# Patient Record
(demographics unavailable — no encounter records)

---

## 2025-01-03 NOTE — HISTORY OF PRESENT ILLNESS
[FreeTextEntry1] : Since last visit, she started IVIg (Gammagard) treatment: 30gm IVPB, daily for 5 days, every 4 weeks for 3 months. She experiences improvement in the feet with less numbness. Feet are still tingle. Balance is still poor. Still has some leg cramps. No fall. No noticeable improvement in symptoms of hands. No side effects.  Previous visits:  She presented initially for evaluation of symptoms of upper and lower extremities as well as cognitive complaints. At this point, no longer has cognitive complaint.  She developed insidious onset of left hand tingling and numbness affecting predominantly the fingers for more than 1 year(7/2023). Of note, she is right handed. Over time, the symptoms got worse. At this point, she has tingling and numbness all the time. Sometimes, the abnormal sensations spread upward to distal forearm. She at times woke up in the middle of night by the tingling. However, she denied associated pain at wrist, elbow or neck. During the day, she had no limitation of using the hand. The tingling and numbness did not change regardless what she did. The most numb finger is the 5th digit. She had a chronic head tilt that she is aware of. However, she denied pain in the neck. She did have chronic left shoulder problem and had left shoulder replacement.  She also had chronic numbness of toes in right foot shortly after right knee surgery in 2011. Over the last few months, the toes in left foot also got numb, associated with tingling. She denied weakness of arms and legs. She had low level of chronic low back pain without radiation. She stated that she lost 2 inches in height due to arthritis in spine. However, she had no established diagnosis by rheumatologist. She used meloxicam excessively, and stopped since recent lab showed impaired renal function. The repeat lab showed persistence of mild impaired renal function. Did not have more follow up blood work since last visit.  She endorsed chronic bladder dysfunction for 5 years without known diagnosis by . She has less sense of urinary urgency since last visit. There is no issue in bowel movement.  Over the years, she felt more clumsy in balance control. She had some falls lead to bruises. Balance control has gotten worse prior to initiation of IVIg. She also experiences intermittent leg cramps at night.

## 2025-01-03 NOTE — PHYSICAL EXAM
[FreeTextEntry1] : Constitutional: alert and in no acute distress. Psychiatric: oriented to person, place, and time, insight and judgment were intact and the affect was normal. Neurologic: Orientation: oriented to person, oriented to place and oriented to time. Memory: recent registration memory intact. Attention: normal concentrating ability and visual attention was not decreased. Language: no difficulty naming common objects, no difficulty repeating a phrase, no difficulty writing a sentence, fluency intact, comprehension intact and reading intact. Fund of knowledge: displays adequate knowledge of personal past history and adequate range of vocabulary. Cranial Nerves: visual acuity intact bilaterally, visual fields full to confrontation, pupils equal round and reactive to light, extraocular motion intact, facial sensation intact symmetrically, face symmetrical, tongue and palate midline, head turning and shoulder shrug symmetric and there was no tongue deviation with protrusion. Cranial nerve VIII: (impaired bilaterally). Motor: muscle tone was normal in all four extremities, muscle strength was normal in all four extremities and normal bulk in all four extremities. Motor Strength:. the patient is right hand dominant. Sensory exam: proprioception was intact. a positive Romberg's sign was present. Light Touch: (mild decrease in left 2-4 digits finger tips, dorsum and palm, distal lower extremities up to ankles). Pain/Temperature: (distal lower extremities up to distal calf in left side, upper calf region in right side). Vibration: Vibratory sensation was decreased at the toes of both feet. Coordination:. balance was intact. there was no past-pointing. no tremor present. Dysdiadochokinesia was not present. Finger to nose dysmetria was not present. Heel-shin dysmetria was not present. Deep tendon reflexes: Biceps right 1+. Biceps left 1+. Triceps right 1+. Triceps left 1+. Brachioradialis right 1+. Brachioradialis left 1+. Patella right 2+. Patella left 2+. Ankle jerk right 1+. Ankle jerk left 0. Plantar responses normal on the right, normal on the left. Ankle Clonus absent on the right, absent on the left. Superficial/Primitive Reflexes: the glabellar reflex was present. Positive Jaw jerk. Neck:. rigid, nontendered bilateral posterior and lateral neck muscles, limited lateral movement and less limited in extension, right laterocollis. Vascular:. there was no peripheral edema. Back: no spinal tenderness. SLR negative. Musculoskeletal:. Tinel of cubital tunnel positive bilaterally, hammer toes and high arch bilaterally, gait narrowed based, good size steps, 2-3 steps to turn. No retropulsion.

## 2025-01-03 NOTE — PROCEDURE
[FreeTextEntry1] : 7/17/24 brain MRI: reported mild small vessel disease. images reviewed with patient. 7/19/24 REEG: reported normal. 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 9/4/24 GM1 IgG mildly positive. Other labs for CIDP work up negative. 9/7/24 lumbar spine with and without contrast: reported mild disc bulging/ridging at the L1-2 through L4-5 levels. No severe stenosis in spinal canal or enhancement; Mild central spondylotic ridging at L5-S1. Images reviewed. 9/7/24 cervical spine with and without contrast: reported DJDs, most prominent at C6-7, right more than left, no obvious enhancement. Images reviewed.

## 2025-01-03 NOTE — DISCUSSION/SUMMARY
[FreeTextEntry1] : Will continue same dosage and schedule of IVIg treatment for next 6 months. Continue daily low dosage of aspirin for stroke prophylaxis.

## 2025-02-24 NOTE — PHYSICAL EXAM
[General Appearance - Alert] : alert [General Appearance - In No Acute Distress] : in no acute distress [Delayed in the Right Toes] : capillary refills normal in right toes [Delayed in the Left Toes] : capillary refills normal in the left toes [Skin Turgor] : normal skin turgor [Foot Ulcer] : no foot ulcer [Position Sense Dec.] : diminished position sense at the level of the toes [Oriented To Time, Place, And Person] : oriented to person, place, and time [Impaired Insight] : insight and judgment were intact [Affect] : the affect was normal

## 2025-02-24 NOTE — ASSESSMENT
[FreeTextEntry1] : 1) Onychomycosis with elongated toenails with pain: aseptic debridement of elongated mycotic toenails x 6, continue topical antifungals 2) Tinea pedis: continue topical antifungal BID  Patient tolerated all treatments well and without any complications Follow up in 2 months [Verbal] : verbal [Patient] : patient [Good - alert, interested, motivated] : Good - alert, interested, motivated [Verbalizes knowledge/Understanding] : Verbalizes knowledge/understanding [Skin Care] : skin care [Pt responsibility to plan of care] : patient responsibility to plan of care

## 2025-02-24 NOTE — PROCEDURE
[FreeTextEntry1] : 1) Onychomycosis with elongated toenails with pain: aseptic debridement of elongated mycotic toenails x 6  Patient tolerated all treatments well and without any complications

## 2025-02-24 NOTE — HISTORY OF PRESENT ILLNESS
[FreeTextEntry1] : Ms. ALTON GRIFFITH is a 71 year F who is seen in the office for onychomycosis, tinea pedis. Patient denies any current or recent f/c/n/v/sob/chest pain. AAOx3 and in NAD. Patient has hx of CIDP and lumbosacral radiculitis.

## 2025-04-28 NOTE — ASSESSMENT
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

## 2025-04-28 NOTE — HISTORY OF PRESENT ILLNESS
[FreeTextEntry1] : Ms. ALTON GRIFFITH is a 71 year F who is seen in the office for peripheral neuropathy to feet, onychomycosis, tinea pedis, callus. Patient denies any current or recent f/c/n/v/sob/chest pain. AAOx3 and in NAD. Patient has hx of CIDP and lumbosacral radiculitis.

## 2025-04-28 NOTE — REASON FOR VISIT
[Follow-Up Visit] : a follow-up visit for [FreeTextEntry2] : peripheral neuropathy to feet, onychomycosis, tinea pedis, callus

## 2025-04-28 NOTE — PROCEDURE
[FreeTextEntry1] : 1) Onychomycosis with elongated toenails with pain: aseptic debridement of elongated mycotic toenails x 6 2) Callus: aseptic debridement and paring of painful Right foot submet 5th callus x 1  Patient tolerated all treatments well and without any complications

## 2025-04-28 NOTE — PHYSICAL EXAM
[General Appearance - Alert] : alert [General Appearance - In No Acute Distress] : in no acute distress [] : normal voice and communication [Delayed in the Right Toes] : capillary refills normal in right toes [Delayed in the Left Toes] : capillary refills normal in the left toes [FreeTextEntry3] : skin temperature gradient is WNL to bilateral lower extremities [No Joint Swelling] : no joint swelling [Skin Turgor] : normal skin turgor [Foot Ulcer] : no foot ulcer [Position Sense Dec.] : diminished position sense at the level of the toes [FreeTextEntry1] : gross epicritic sensation to feet diminished, light touch sensation diminished, sharp/dull sensation intact [Oriented To Time, Place, And Person] : oriented to person, place, and time [Impaired Insight] : insight and judgment were intact [Affect] : the affect was normal

## 2025-07-01 NOTE — PROCEDURE
[FreeTextEntry1] : 1) Onychomycosis with onychodystrophy/onychogryphosis: aseptic debridement of thick/elongated, incurvated, mycotic and dystrophic toenails with pain/discomfort x 6 done 06/30/2025, Rx ciclopirox topical solution daily   Patient tolerated all treatments well and without any complications

## 2025-07-01 NOTE — PHYSICAL EXAM
[General Appearance - Alert] : alert [General Appearance - In No Acute Distress] : in no acute distress [] : normal voice and communication [Delayed in the Right Toes] : capillary refills normal in right toes [Delayed in the Left Toes] : capillary refills normal in the left toes [FreeTextEntry3] : skin temperature gradient is WNL to bilateral lower extremities [No Joint Swelling] : no joint swelling [de-identified] : ROM of ankle, subtalar, midtarsal, MPJ, IPJ are adequate bilateral 5/5 muscle power in inversion, eversion, dorsiflexion, plantarflexion bilateral  [Skin Turgor] : normal skin turgor [Foot Ulcer] : no foot ulcer [FreeTextEntry1] : gross epicritic sensation to feet diminished, light touch sensation diminished, sharp/dull sensation intact [Oriented To Time, Place, And Person] : oriented to person, place, and time [Impaired Insight] : insight and judgment were intact [Affect] : the affect was normal

## 2025-07-01 NOTE — ASSESSMENT
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, improvement and less symptomatic on IVIg, continue to monitor, f/u with her Neurologist  Patient with Q9, class B and C findings of:   + decreased pedal hair growth + thickened toenail changes - skin discoloration + thin/shiny skin texture - skin rubor   - claudication - cool feet + edema + paresthesia - burning   Patient tolerated all treatments well and without any complications Follow up in 2 months    [Verbal] : verbal [Patient] : patient [Good - alert, interested, motivated] : Good - alert, interested, motivated [Verbalizes knowledge/Understanding] : Verbalizes knowledge/understanding [Pt responsibility to plan of care] : patient responsibility to plan of care

## 2025-07-01 NOTE — HISTORY OF PRESENT ILLNESS
[FreeTextEntry1] : Ms. ALTON GRIFFITH is a 71 year F who is seen in the office for peripheral neuropathy, onychomycosis, tinea pedis. Patient denies any current or recent f/c/n/v/sob/chest pain. AAOx3 and in NAD.   Patient has hx of CIDP and lumbosacral radiculitis.

## 2025-07-02 NOTE — PLAN
[FreeTextEntry1] : She was advised to apply the clobetasol cream nightly and to return for a follow-up exam in 1 month.

## 2025-07-17 NOTE — PHYSICAL EXAM
[FreeTextEntry1] : Constitutional: alert and in no acute distress. Psychiatric: oriented to person, place, and time, insight and judgment were intact and the affect was normal. Neurologic: Orientation: oriented to person, oriented to place and oriented to time. Memory: recent registration memory intact. Attention: normal concentrating ability and visual attention was not decreased. Language: no difficulty naming common objects, no difficulty repeating a phrase, no difficulty writing a sentence, fluency intact, comprehension intact and reading intact. Fund of knowledge: displays adequate knowledge of personal past history and adequate range of vocabulary. Cranial Nerves: visual acuity intact bilaterally, visual fields full to confrontation, pupils equal round and reactive to light, extraocular motion intact, facial sensation intact symmetrically, face symmetrical, tongue and palate midline, head turning and shoulder shrug symmetric and there was no tongue deviation with protrusion. Cranial nerve VIII: (impaired bilaterally). Motor: muscle tone and bulk were normal in all four extremities, muscle strength showed left proximal LE 4+-5-/5, distally, 5-/5 in plantar flexion. Right LE proximal 5-/5. Motor Strength:. the patient is right hand dominant. Sensory exam: proprioception was intact. a positive Romberg's sign was present. Light Touch: (mild decrease in left 2-4 digits finger tips, dorsum and palm, distal lower extremities up to ankles). Pain/Temperature: (distal lower extremities up to distal calf in left side, upper calf region in right side). Vibration: Vibratory sensation was decreased at the toes of both feet. Coordination:. balance was intact. there was no past-pointing. no tremor present. Dysdiadochokinesia was not present. Finger to nose dysmetria was not present. Heel-shin dysmetria was not present. Deep tendon reflexes: Biceps right 1+. Biceps left 1+. Triceps right 1+. Triceps left 1+. Brachioradialis right 1+. Brachioradialis left 1+. Patella right 2+. Patella left 2+ with delay. Ankle jerk right 0. Ankle jerk left 0. Plantar responses normal on the right, normal on the left. Ankle Clonus absent on the right, absent on the left. Superficial/Primitive Reflexes: the glabellar reflex was present. Positive Jaw jerk. Neck:. rigid, nontendered bilateral posterior and lateral neck muscles, limited lateral movement and less limited in extension, right laterocollis. Vascular:. there was no peripheral edema. Back: no spinal tenderness. SLR negative. Musculoskeletal:. Tinel of cubital tunnel positive bilaterally, hammer toes and high arch bilaterally, gait narrowed based, good size steps, 2-3 steps to turn. No retropulsion.

## 2025-07-17 NOTE — DISCUSSION/SUMMARY
[FreeTextEntry1] : Significant improvement of symptoms of CIDP by report. Neurological examination still shows significant sensory and motor deficits. Will continue IVIg, but change to 40gm daily for 4 days, every 4 weeks over next 6 months. Try to resume daily aspirin if possible.

## 2025-07-17 NOTE — HISTORY OF PRESENT ILLNESS
[FreeTextEntry1] : Since last visit, she maintains on IVIg (Gammagard) treatment: 30gm IVPB, daily for 5 days, every 4 weeks. She started the treatment for 9 months now. She experiences improvement in the feet with less tingling. Still feels numb. The left hand is Tinling intermittently. Balance is still poor. No fall. No need for assistance device. No leg cramps lately. No side effects.  She is diagnosed of a form of autoimmune dermatitis and being treated with a cream, cortisone based. She stopped aspirin when she had a nose bleed. At this point, she feels that the nose might be due to CPAP machine. Feels that she has more arthritic pain. She takes Tylenol as needed. No flare up of neck or low back pain.  Previous visits:  She presented initially for evaluation of symptoms of upper and lower extremities as well as cognitive complaints. At this point, no longer has cognitive complaint.  She developed insidious onset of left-hand tingling and numbness affecting predominantly the fingers for more than 1 year (7/2023). Of note, she is right-handed. Over time, the symptoms got worse. At this point, she has tingling and numbness all the time. Sometimes, the abnormal sensations spread upward to distal forearm. She at times woke up in the middle of night by the tingling. However, she denied associated pain at wrist, elbow or neck. During the day, she had no limitation of using the hand. The tingling and numbness did not change regardless of what she did. The numbest finger is the 5th digit. She had a chronic head tilt that she is aware of. However, she denied pain in the neck. She did have chronic left shoulder problem and had left shoulder replacement.  She also had chronic numbness of toes in right foot shortly after right knee surgery in 2011. Over the last few months, the toes in left foot also got numb, associated with tingling. She denied weakness of arms and legs. She had low level of chronic low back pain without radiation. She stated that she lost 2 inches in height due to arthritis in spine. However, she had no established diagnosis by rheumatologist. She used meloxicam excessively and stopped since recent lab showed impaired renal function. The repeat lab showed persistence of mild impaired renal function. Did not have more follow up blood work since last visit.  She endorsed chronic bladder dysfunction for 5 years without known diagnosis by . She has less sense of urinary urgency since last visit. There is no issue in bowel movement.  Over the years, she felt clumsier in balance control. She had some falls lead to bruises. Balance control has gotten worse prior to initiation of IVIg. She also experiences intermittent leg cramps at night.